# Patient Record
Sex: FEMALE | Race: BLACK OR AFRICAN AMERICAN | NOT HISPANIC OR LATINO | Employment: FULL TIME | URBAN - METROPOLITAN AREA
[De-identification: names, ages, dates, MRNs, and addresses within clinical notes are randomized per-mention and may not be internally consistent; named-entity substitution may affect disease eponyms.]

---

## 2018-01-04 ENCOUNTER — TELEPHONE (OUTPATIENT)
Dept: OBSTETRICS AND GYNECOLOGY | Facility: CLINIC | Age: 48
End: 2018-01-04

## 2018-01-04 DIAGNOSIS — Z12.39 BREAST CANCER SCREENING: Primary | ICD-10-CM

## 2018-01-04 NOTE — TELEPHONE ENCOUNTER
----- Message from Mukul Odonnell sent at 1/4/2018  9:18 AM CST -----  Contact: patient  x_ 1st Request   _ 2nd Request   _ 3rd Request     Who: KODAK PAREDES [[id number]]    Why: patient called is requesting that you put orders put in the system for mammogram.    What Number to Call Back: [phone]    When to Expect a call back: (Before the end of the day)   -- if call after 3:00 call back will be tomorrow.

## 2018-01-30 ENCOUNTER — OFFICE VISIT (OUTPATIENT)
Dept: OBSTETRICS AND GYNECOLOGY | Facility: CLINIC | Age: 48
End: 2018-01-30
Payer: COMMERCIAL

## 2018-01-30 ENCOUNTER — HOSPITAL ENCOUNTER (OUTPATIENT)
Dept: RADIOLOGY | Facility: HOSPITAL | Age: 48
Discharge: HOME | End: 2018-01-30
Attending: NURSE PRACTITIONER
Payer: COMMERCIAL

## 2018-01-30 ENCOUNTER — TELEPHONE (OUTPATIENT)
Dept: OBSTETRICS AND GYNECOLOGY | Facility: CLINIC | Age: 48
End: 2018-01-30

## 2018-01-30 VITALS — BODY MASS INDEX: 35.32 KG/M2 | HEIGHT: 65 IN | WEIGHT: 212 LBS

## 2018-01-30 VITALS
BODY MASS INDEX: 34.47 KG/M2 | HEIGHT: 66 IN | DIASTOLIC BLOOD PRESSURE: 78 MMHG | WEIGHT: 214.5 LBS | SYSTOLIC BLOOD PRESSURE: 144 MMHG

## 2018-01-30 DIAGNOSIS — Z01.419 WELL WOMAN EXAM WITH ROUTINE GYNECOLOGICAL EXAM: Primary | ICD-10-CM

## 2018-01-30 DIAGNOSIS — N92.1 METRORRHAGIA: ICD-10-CM

## 2018-01-30 DIAGNOSIS — N95.1 PERIMENOPAUSE: ICD-10-CM

## 2018-01-30 DIAGNOSIS — Z87.42 HISTORY OF ABNORMAL CERVICAL PAP SMEAR: ICD-10-CM

## 2018-01-30 DIAGNOSIS — N94.6 DYSMENORRHEA: ICD-10-CM

## 2018-01-30 DIAGNOSIS — D25.9 UTERINE LEIOMYOMA, UNSPECIFIED LOCATION: ICD-10-CM

## 2018-01-30 DIAGNOSIS — Z12.39 BREAST CANCER SCREENING: ICD-10-CM

## 2018-01-30 PROCEDURE — 99999 PR PBB SHADOW E&M-EST. PATIENT-LVL III: CPT | Mod: PBBFAC,,, | Performed by: NURSE PRACTITIONER

## 2018-01-30 PROCEDURE — 77067 SCR MAMMO BI INCL CAD: CPT | Mod: TC

## 2018-01-30 PROCEDURE — 77063 BREAST TOMOSYNTHESIS BI: CPT | Mod: 26,,, | Performed by: RADIOLOGY

## 2018-01-30 PROCEDURE — 77067 SCR MAMMO BI INCL CAD: CPT | Mod: 26,,, | Performed by: RADIOLOGY

## 2018-01-30 PROCEDURE — 88175 CYTOPATH C/V AUTO FLUID REDO: CPT

## 2018-01-30 PROCEDURE — 99396 PREV VISIT EST AGE 40-64: CPT | Mod: S$GLB,,, | Performed by: NURSE PRACTITIONER

## 2018-01-30 NOTE — PROGRESS NOTES
HISTORY OF PRESENT ILLNESS:    John Harris is a 47 y.o. female, , Patient's last menstrual period was 2018 (exact date).,  presents for a routine exam and continues with heavy painful periods.   -Doesn't want medical or surgical intervention at this time.   -Using double protection with clots and gushing the first two days, bleeds for a total of 5-6 krishna.   -Periods are more predictable this year and using OTC Ibuprofen successfully for cramping.   -Saw Dr Mac 16 for a surgery consult and decided against surgery, was given a Rx for Lysteda, which she tried, but it made her sleepy, so is not taking it.       DATA REVIEWED:  ENDOMETRIAL BIOPSY: 16:  Benign endometrium  No evidence of hyperplasia or malignancy  PELVIC US 16:  The uterus measures 10.1 x 5.3 x 5.9 cm. Uterine parenchyma demonstrates a single hypoechoic intramural fibroid within the anterior uterine body measuring 3.4 x 2.7 x 3.0 cm. The endometrial echo complex is normal in thickness and measures 12 mm.  Neither ovary was able to be visualized on transabdominal approach.  No significant free fluid seen.  Impression:  3 cm fibroid      Past Medical History:   Diagnosis Date    Abnormal Pap smear     Colposcopy    Obesity        PAST SURGICAL HISTORY:  History reviewed. No pertinent surgical history.    MEDICATIONS AND ALLERGIES:  Ibuprofen prn    Review of patient's allergies indicates:  No Known Allergies    Family History   Problem Relation Age of Onset    Breast cancer Maternal Grandmother     Breast cancer Mother     Breast cancer Maternal Aunt     Colon cancer Neg Hx     Ovarian cancer Neg Hx        Social History     Social History    Marital status:      Spouse name: N/A    Number of children: N/A    Years of education: N/A     Occupational History    Not on file.     Social History Main Topics    Smoking status: Never Smoker    Smokeless tobacco: Not on file    Alcohol use No    Drug  "use: No    Sexual activity: Yes     Partners: Male     Birth control/ protection: Other-see comments      Comment: Vasectomy     Other Topics Concern    Not on file     Social History Narrative    No narrative on file       OB HISTORY: Number of vaginal deliveries: 3     COMPREHENSIVE GYN HISTORY:  PAP History: Reports abnormal Pap: 2003. Treatment: colposcopy. LAST PAP 2-17-14 NORMAL.  Infection History: Reports STDs: HPV. Denies PID.  Benign History: Reports uterine fibroids. Denies ovarian cysts. Denies endometriosis. Denies other conditions.  Cancer History: Denies cervical cancer. Denies uterine cancer or hyperplasia. Denies ovarian cancer. Denies vulvar cancer or pre-cancer. Denies vaginal cancer or pre-cancer. Denies breast cancer. Denies colon cancer.  Sexual Activity History: Denies currently being sexually active  Menstrual History: Flows every 26 - 28 Days. For 5 -6  Days. Heavy Flow. SEE HPI.   Dysmenorrhea History: Reports moderate dysmenorrhea.   Contraception:  has vasectomy.       ROS:  GENERAL: No weight changes. No swelling. No fatigue. No fever.  CARDIOVASCULAR: No chest pain. No shortness of breath. No leg cramps.   NEUROLOGICAL: No headaches. No vision changes.  BREASTS: No pain. No lumps. No discharge.  ABDOMEN: + CRAMPS. No nausea. No vomiting. No diarrhea. No constipation.  REPRODUCTIVE: + HEAVY PERIODS.   VULVA: No pain. No lesions. No itching.  VAGINA: No relaxation. No itching. No odor. No discharge. No lesions.  URINARY: No incontinence. No nocturia. No frequency. No dysuria.    BP (!) 144/78   Ht 5' 6" (1.676 m)   Wt 97.3 kg (214 lb 8.1 oz)   LMP 01/14/2018 (Exact Date)   BMI 34.62 kg/m²     PE:  APPEARANCE: Well nourished, well developed, in no acute distress.  AFFECT: WNL, alert and oriented x 3.  SKIN: No acne or hirsutism.  NECK: Neck symmetric, without masses or thyromegaly.  NODES: No inguinal, cervical, axillary or femoral lymph node enlargement.  CHEST: Good " respiratory effort.   ABDOMEN: Soft. No tenderness or masses. OBESE.  BREASTS: Symmetrical, no skin changes, visible lesions, palpable masses or nipple discharge bilaterally.  PELVIC: External female genitalia without lesions.  Female hair distribution. Adequate perineal body, Normal urethral meatus. Vagina moist and well rugated without lesions or discharge.  No significant cystocele or rectocele present. Cervix pink without lesions, discharge or tenderness. Uterus is 8 week size, regular, mobile and nontender. Adnexa without masses or tenderness. EXAM DIFFICULT DUE TO BODY HABITUS.  EXTREMITIES: No edema    DIAGNOSIS:  1. Well woman exam with routine gynecological exam    2. Perimenopause    3. Uterine leiomyoma, unspecified location    4. Metrorrhagia    5. Dysmenorrhea    6. History of abnormal cervical Pap smear        PLAN:    Orders Placed This Encounter    Liquid-based pap smear, screening   Had mammogram today    COUNSELING:  The patient was counseled today on:  -medical / surgical options for cycle control as per Dr Mac's note 12-8-16, which she declined -advised to call if bleeding/pain worsens;  -A.C.S. Pap and pelvic exam guidelines (pap every 3 years), recomendations for yearly mammogram;  -to follow up with her PCP for other health maintenance.    FOLLOW-UP with me annually.

## 2019-03-20 ENCOUNTER — TELEPHONE (OUTPATIENT)
Dept: OBSTETRICS AND GYNECOLOGY | Facility: CLINIC | Age: 49
End: 2019-03-20

## 2019-03-20 DIAGNOSIS — Z12.31 SCREENING MAMMOGRAM, ENCOUNTER FOR: Primary | ICD-10-CM

## 2019-03-20 NOTE — TELEPHONE ENCOUNTER
----- Message from Porsche Marshall sent at 3/20/2019 11:58 AM CDT -----  Name of Who is Calling:KODAK PAREDES [[id number]]    What is the request in detail: Pt needs an order for a mammogram      Can the clinic reply by [facility]:  No       What Number to Call Back if not in [facility]:[phone]

## 2019-04-09 ENCOUNTER — OFFICE VISIT (OUTPATIENT)
Dept: OBSTETRICS AND GYNECOLOGY | Facility: CLINIC | Age: 49
End: 2019-04-09
Payer: COMMERCIAL

## 2019-04-09 ENCOUNTER — TELEPHONE (OUTPATIENT)
Dept: OBSTETRICS AND GYNECOLOGY | Facility: CLINIC | Age: 49
End: 2019-04-09

## 2019-04-09 ENCOUNTER — HOSPITAL ENCOUNTER (OUTPATIENT)
Dept: RADIOLOGY | Facility: HOSPITAL | Age: 49
Discharge: HOME | End: 2019-04-09
Attending: NURSE PRACTITIONER
Payer: COMMERCIAL

## 2019-04-09 VITALS
BODY MASS INDEX: 31.18 KG/M2 | SYSTOLIC BLOOD PRESSURE: 140 MMHG | WEIGHT: 194 LBS | DIASTOLIC BLOOD PRESSURE: 93 MMHG | HEIGHT: 66 IN

## 2019-04-09 DIAGNOSIS — Z12.31 SCREENING MAMMOGRAM, ENCOUNTER FOR: ICD-10-CM

## 2019-04-09 DIAGNOSIS — N94.6 DYSMENORRHEA: ICD-10-CM

## 2019-04-09 DIAGNOSIS — N92.4 ABNORMAL PERIMENOPAUSAL BLEEDING: ICD-10-CM

## 2019-04-09 DIAGNOSIS — Z87.42 HISTORY OF ABNORMAL CERVICAL PAP SMEAR: ICD-10-CM

## 2019-04-09 DIAGNOSIS — D25.9 UTERINE LEIOMYOMA, UNSPECIFIED LOCATION: ICD-10-CM

## 2019-04-09 DIAGNOSIS — Z01.419 WELL WOMAN EXAM WITH ROUTINE GYNECOLOGICAL EXAM: Primary | ICD-10-CM

## 2019-04-09 PROCEDURE — 77067 MAMMO DIGITAL SCREENING BILAT WITH TOMOSYNTHESIS_CAD: ICD-10-PCS | Mod: 26,,, | Performed by: RADIOLOGY

## 2019-04-09 PROCEDURE — 99396 PR PREVENTIVE VISIT,EST,40-64: ICD-10-PCS | Mod: S$GLB,,, | Performed by: NURSE PRACTITIONER

## 2019-04-09 PROCEDURE — 99999 PR PBB SHADOW E&M-EST. PATIENT-LVL III: CPT | Mod: PBBFAC,,, | Performed by: NURSE PRACTITIONER

## 2019-04-09 PROCEDURE — 77063 MAMMO DIGITAL SCREENING BILAT WITH TOMOSYNTHESIS_CAD: ICD-10-PCS | Mod: 26,,, | Performed by: RADIOLOGY

## 2019-04-09 PROCEDURE — 77067 SCR MAMMO BI INCL CAD: CPT | Mod: TC

## 2019-04-09 PROCEDURE — 99999 PR PBB SHADOW E&M-EST. PATIENT-LVL III: ICD-10-PCS | Mod: PBBFAC,,, | Performed by: NURSE PRACTITIONER

## 2019-04-09 PROCEDURE — 77067 SCR MAMMO BI INCL CAD: CPT | Mod: 26,,, | Performed by: RADIOLOGY

## 2019-04-09 PROCEDURE — 77063 BREAST TOMOSYNTHESIS BI: CPT | Mod: 26,,, | Performed by: RADIOLOGY

## 2019-04-09 PROCEDURE — 99396 PREV VISIT EST AGE 40-64: CPT | Mod: S$GLB,,, | Performed by: NURSE PRACTITIONER

## 2019-04-09 NOTE — PROGRESS NOTES
HISTORY OF PRESENT ILLNESS:    John Harris is a 48 y.o. female, , Patient's last menstrual period was 2019 (approximate).,  presents for a routine exam and continues with abnormal periods.  -Reports skipping periods in October / November, then had a heavy painful flow in December requiring use of double protection, with spotting a few weeks later.   -January, February, March were regular flow.   -Denies vasomotor symptoms.    DATA REVIEWED:  ENDOMETRIAL BIOPSY: 16:  Benign endometrium  No evidence of hyperplasia or malignancy  PELVIC US 16:  The uterus measures 10.1 x 5.3 x 5.9 cm. Uterine parenchyma demonstrates a single hypoechoic intramural fibroid within the anterior uterine body measuring 3.4 x 2.7 x 3.0 cm. The endometrial echo complex is normal in thickness and measures 12 mm.  Neither ovary was able to be visualized on transabdominal approach.  No significant free fluid seen.  Impression:  3 cm fibroid    MY NOTE 18  -Continues with heavy painful periods.   -Doesn't want medical or surgical intervention at this time.   -Using double protection with clots and gushing the first two days, bleeds for a total of 5-6 krishna.   -Periods are more predictable this year and using OTC Ibuprofen successfully for cramping.   -Saw Dr Mac 16 for a surgery consult and decided against surgery, was given a Rx for Lysteda, which she tried, but it made her sleepy, so is not taking it      Past Medical History:   Diagnosis Date    Abnormal Pap smear     Colposcopy    Obesity        PAST SURGICAL HISTORY:  History reviewed. No pertinent surgical history.    MEDICATIONS AND ALLERGIES:  No current outpatient medications on file.    Review of patient's allergies indicates:  No Known Allergies    Family History   Problem Relation Age of Onset    Breast cancer Maternal Grandmother     Breast cancer Mother     Breast cancer Maternal Aunt     Colon cancer Neg Hx     Ovarian cancer Neg  Hx        Social History     Socioeconomic History    Marital status:      Spouse name: Not on file    Number of children: Not on file    Years of education: Not on file    Highest education level: Not on file   Occupational History    Not on file   Social Needs    Financial resource strain: Not on file    Food insecurity:     Worry: Not on file     Inability: Not on file    Transportation needs:     Medical: Not on file     Non-medical: Not on file   Tobacco Use    Smoking status: Never Smoker   Substance and Sexual Activity    Alcohol use: No    Drug use: No    Sexual activity: Yes     Partners: Male     Birth control/protection: Other-see comments     Comment: Vasectomy   Lifestyle    Physical activity:     Days per week: Not on file     Minutes per session: Not on file    Stress: Not on file   Relationships    Social connections:     Talks on phone: Not on file     Gets together: Not on file     Attends [employer] service: Not on file     Active member of club or organization: Not on file     Attends meetings of clubs or organizations: Not on file     Relationship status: Not on file   Other Topics Concern    Not on file   Social History Narrative    Not on file       OB HISTORY: Number of vaginal deliveries: 3     COMPREHENSIVE GYN HISTORY:  PAP History: Reports abnormal Pap: 2003. Treatment: colposcopy. LAST PAP 1-30-18 NORMAL.  Infection History: Reports STDs: HPV. Denies PID.  Benign History: Reports uterine fibroids. Denies ovarian cysts. Denies endometriosis. Denies other conditions.  Cancer History: Denies cervical cancer. Denies uterine cancer or hyperplasia. Denies ovarian cancer. Denies vulvar cancer or pre-cancer. Denies vaginal cancer or pre-cancer. Denies breast cancer. Denies colon cancer.  Sexual Activity History: Denies currently being sexually active  Menstrual History: SEE HPI.   Dysmenorrhea History: Reports moderate dysmenorrhea.   Contraception:  has vasectomy.  "    ROS:  GENERAL: + INTENTIONAL WT LOSS. No swelling. No fatigue. No fever.  CARDIOVASCULAR: No chest pain. No shortness of breath. No leg cramps.   NEUROLOGICAL: No headaches. No vision changes.  BREASTS: No pain. No lumps. No discharge.  ABDOMEN: No pain. No nausea. No vomiting. No diarrhea. No constipation.  REPRODUCTIVE: + AUB.   VULVA: No pain. No lesions. No itching.  VAGINA: No relaxation. No itching. No odor. No discharge. No lesions.  URINARY: No incontinence. No nocturia. No frequency. No dysuria.    BP (!) 140/93   Ht 5' 6" (1.676 m)   Wt 88 kg (194 lb 0.1 oz)   LMP 03/27/2019 (Approximate)   BMI 31.31 kg/m²   1-30-18 Wt 97.3 kg (214 lb 8.1 oz)    PE:  APPEARANCE: Well nourished, well developed, in no acute distress.  AFFECT: WNL, alert and oriented x 3.  SKIN: No acne or hirsutism.  NECK: Neck symmetric, without masses or thyromegaly.  NODES: No inguinal, cervical, axillary or femoral lymph node enlargement.  CHEST: Good respiratory effort.   ABDOMEN: Soft. No tenderness or masses. OBESE.  BREASTS: Symmetrical, no skin changes, visible lesions, palpable masses or nipple discharge bilaterally.  PELVIC: External female genitalia without lesions.  Female hair distribution. Adequate perineal body, Normal urethral meatus. Vagina moist and well rugated without lesions or discharge.  No significant cystocele or rectocele present. Cervix pink without lesions, discharge or tenderness. Uterus is 10 week size, regular, mobile and nontender. Adnexa without masses or tenderness. EXAM DIFFICULT DUE TO BODY HABITUS.  EXTREMITIES: No edema    DIAGNOSIS:  1. Well woman exam with routine gynecological exam    2. Abnormal perimenopausal bleeding    3. Dysmenorrhea    4. Uterine leiomyoma, unspecified location    5. History of abnormal cervical Pap smear        PLAN:    Orders Placed This Encounter    US Pelvis Complete Non OB   Has mammogram scheduled today    COUNSELING:  The patient was counseled today " on:  -perimenopause vs menopause and to report severe vasomotor symptoms and/or skipping periods, heavy, prolonged bleeding, spotting in between periods;  -A.C.S. Pap and pelvic exam guidelines (pap every 3 years), recomendations for yearly mammogram;  -to follow up with her PCP for other health maintenance.    FOLLOW-UP with me pending test results and with Dr Mac for a surgery consult and annually.

## 2019-04-18 ENCOUNTER — HOSPITAL ENCOUNTER (OUTPATIENT)
Dept: RADIOLOGY | Facility: HOSPITAL | Age: 49
Discharge: HOME | End: 2019-04-18
Attending: NURSE PRACTITIONER
Payer: COMMERCIAL

## 2019-04-18 DIAGNOSIS — N92.4 ABNORMAL PERIMENOPAUSAL BLEEDING: ICD-10-CM

## 2019-04-18 DIAGNOSIS — D25.9 UTERINE LEIOMYOMA, UNSPECIFIED LOCATION: ICD-10-CM

## 2019-04-18 PROCEDURE — 76830 US PELVIS COMP WITH TRANSVAG NON-OB (XPD): ICD-10-PCS | Mod: 26,,, | Performed by: RADIOLOGY

## 2019-04-18 PROCEDURE — 76856 US EXAM PELVIC COMPLETE: CPT | Mod: 26,,, | Performed by: RADIOLOGY

## 2019-04-18 PROCEDURE — 76830 TRANSVAGINAL US NON-OB: CPT | Mod: TC

## 2019-04-18 PROCEDURE — 76856 US PELVIS COMP WITH TRANSVAG NON-OB (XPD): ICD-10-PCS | Mod: 26,,, | Performed by: RADIOLOGY

## 2019-04-18 PROCEDURE — 76830 TRANSVAGINAL US NON-OB: CPT | Mod: 26,,, | Performed by: RADIOLOGY

## 2020-12-21 ENCOUNTER — TELEPHONE (OUTPATIENT)
Dept: OBSTETRICS AND GYNECOLOGY | Facility: CLINIC | Age: 50
End: 2020-12-21

## 2020-12-21 DIAGNOSIS — Z12.31 SCREENING MAMMOGRAM, ENCOUNTER FOR: Primary | ICD-10-CM

## 2020-12-21 NOTE — TELEPHONE ENCOUNTER
----- Message from Brinda Haider sent at 12/21/2020 12:21 PM CST -----  Regarding: Orders  Type: mammo         Caller is requesting to schedule their mammo appointment prior to annual appointment.    Order is not listed in EPIC.  Please enter order and contact patient to schedule.         Name of Caller: KODAK PAREDES [[id number]]         Preferred Date and Time of Labs: 1/27 after her appt          Date of EPP Appointment: 1/27          Where would they like the lab performed? [facility]          Would the patient rather a call back or a response via [facility]? Y          Best Call Back Number: [phone]         Additional Information:

## 2021-01-27 ENCOUNTER — HOSPITAL ENCOUNTER (OUTPATIENT)
Dept: RADIOLOGY | Facility: HOSPITAL | Age: 51
Discharge: HOME | End: 2021-01-27
Attending: OBSTETRICS & GYNECOLOGY
Payer: COMMERCIAL

## 2021-01-27 VITALS — WEIGHT: 187.38 LBS | HEIGHT: 65 IN | BODY MASS INDEX: 31.22 KG/M2

## 2021-01-27 DIAGNOSIS — Z12.31 SCREENING MAMMOGRAM, ENCOUNTER FOR: ICD-10-CM

## 2021-01-27 PROCEDURE — 77063 BREAST TOMOSYNTHESIS BI: CPT | Mod: 26,,, | Performed by: RADIOLOGY

## 2021-01-27 PROCEDURE — 77063 MAMMO DIGITAL SCREENING BILAT WITH TOMO: ICD-10-PCS | Mod: 26,,, | Performed by: RADIOLOGY

## 2021-01-27 PROCEDURE — 77067 MAMMO DIGITAL SCREENING BILAT WITH TOMO: ICD-10-PCS | Mod: 26,,, | Performed by: RADIOLOGY

## 2021-01-27 PROCEDURE — 77067 SCR MAMMO BI INCL CAD: CPT | Mod: TC

## 2021-01-27 PROCEDURE — 77067 SCR MAMMO BI INCL CAD: CPT | Mod: 26,,, | Performed by: RADIOLOGY

## 2021-03-08 ENCOUNTER — OFFICE VISIT (OUTPATIENT)
Dept: OBSTETRICS AND GYNECOLOGY | Facility: CLINIC | Age: 51
End: 2021-03-08
Attending: OBSTETRICS & GYNECOLOGY
Payer: COMMERCIAL

## 2021-03-08 VITALS
DIASTOLIC BLOOD PRESSURE: 82 MMHG | BODY MASS INDEX: 32.21 KG/M2 | HEIGHT: 65 IN | SYSTOLIC BLOOD PRESSURE: 128 MMHG | WEIGHT: 193.31 LBS

## 2021-03-08 DIAGNOSIS — Z01.419 ENCOUNTER FOR GYNECOLOGICAL EXAMINATION WITHOUT ABNORMAL FINDING: Primary | ICD-10-CM

## 2021-03-08 DIAGNOSIS — Z12.31 SCREENING MAMMOGRAM, ENCOUNTER FOR: ICD-10-CM

## 2021-03-08 DIAGNOSIS — N92.0 MENORRHAGIA WITH REGULAR CYCLE: ICD-10-CM

## 2021-03-08 DIAGNOSIS — Z12.4 PAP SMEAR FOR CERVICAL CANCER SCREENING: ICD-10-CM

## 2021-03-08 PROCEDURE — 1126F AMNT PAIN NOTED NONE PRSNT: CPT | Mod: S$GLB,,, | Performed by: OBSTETRICS & GYNECOLOGY

## 2021-03-08 PROCEDURE — 88175 CYTOPATH C/V AUTO FLUID REDO: CPT | Performed by: OBSTETRICS & GYNECOLOGY

## 2021-03-08 PROCEDURE — 3008F BODY MASS INDEX DOCD: CPT | Mod: CPTII,S$GLB,, | Performed by: OBSTETRICS & GYNECOLOGY

## 2021-03-08 PROCEDURE — 99396 PREV VISIT EST AGE 40-64: CPT | Mod: S$GLB,,, | Performed by: OBSTETRICS & GYNECOLOGY

## 2021-03-08 PROCEDURE — 1126F PR PAIN SEVERITY QUANTIFIED, NO PAIN PRESENT: ICD-10-PCS | Mod: S$GLB,,, | Performed by: OBSTETRICS & GYNECOLOGY

## 2021-03-08 PROCEDURE — 87624 HPV HI-RISK TYP POOLED RSLT: CPT | Performed by: OBSTETRICS & GYNECOLOGY

## 2021-03-08 PROCEDURE — 99396 PR PREVENTIVE VISIT,EST,40-64: ICD-10-PCS | Mod: S$GLB,,, | Performed by: OBSTETRICS & GYNECOLOGY

## 2021-03-08 PROCEDURE — 3008F PR BODY MASS INDEX (BMI) DOCUMENTED: ICD-10-PCS | Mod: CPTII,S$GLB,, | Performed by: OBSTETRICS & GYNECOLOGY

## 2021-03-15 LAB
CLINICAL INFO: NORMAL
CYTO CVX: NORMAL
CYTOLOGIST CVX/VAG CYTO: NORMAL
CYTOLOGY CMNT CVX/VAG CYTO-IMP: NORMAL
CYTOLOGY PAP THIN PREP EXPLANATION: NORMAL
DATE OF PREVIOUS PAP: NORMAL
DATE PREVIOUS BX: NO
HPV I/H RISK 4 DNA CVX QL NAA+PROBE: NOT DETECTED
LMP START DATE: NORMAL
SPECIMEN SOURCE CVX/VAG CYTO: NORMAL
STAT OF ADQ CVX/VAG CYTO-IMP: NORMAL

## 2021-05-04 ENCOUNTER — PATIENT MESSAGE (OUTPATIENT)
Dept: RESEARCH | Facility: HOSPITAL | Age: 51
End: 2021-05-04

## 2022-04-12 ENCOUNTER — TELEPHONE (OUTPATIENT)
Dept: OBSTETRICS AND GYNECOLOGY | Facility: CLINIC | Age: 52
End: 2022-04-12
Payer: COMMERCIAL

## 2022-04-12 DIAGNOSIS — Z12.31 SCREENING MAMMOGRAM, ENCOUNTER FOR: Primary | ICD-10-CM

## 2022-04-12 NOTE — TELEPHONE ENCOUNTER
----- Message from Constance Orellana sent at 4/12/2022  7:16 AM CDT -----  Regarding: New Mammogram referral please  Good morning,    May I please have a new mammogram referral. My pre-auth team said the one we have is closed and the insurance will not approve it.    Thank you,  Constance

## 2023-03-09 ENCOUNTER — OFFICE VISIT (OUTPATIENT)
Dept: OBSTETRICS AND GYNECOLOGY | Facility: CLINIC | Age: 53
End: 2023-03-09
Payer: COMMERCIAL

## 2023-03-09 VITALS — BODY MASS INDEX: 34.45 KG/M2 | SYSTOLIC BLOOD PRESSURE: 127 MMHG | WEIGHT: 207 LBS | DIASTOLIC BLOOD PRESSURE: 80 MMHG

## 2023-03-09 DIAGNOSIS — Z01.419 WELL WOMAN EXAM WITH ROUTINE GYNECOLOGICAL EXAM: Primary | ICD-10-CM

## 2023-03-09 DIAGNOSIS — Z12.31 ENCOUNTER FOR SCREENING MAMMOGRAM FOR BREAST CANCER: ICD-10-CM

## 2023-03-09 DIAGNOSIS — Z12.4 SCREENING FOR CERVICAL CANCER: ICD-10-CM

## 2023-03-09 PROCEDURE — 3079F DIAST BP 80-89 MM HG: CPT | Mod: CPTII,S$GLB,, | Performed by: OBSTETRICS & GYNECOLOGY

## 2023-03-09 PROCEDURE — 1159F MED LIST DOCD IN RCRD: CPT | Mod: CPTII,S$GLB,, | Performed by: OBSTETRICS & GYNECOLOGY

## 2023-03-09 PROCEDURE — 3074F PR MOST RECENT SYSTOLIC BLOOD PRESSURE < 130 MM HG: ICD-10-PCS | Mod: CPTII,S$GLB,, | Performed by: OBSTETRICS & GYNECOLOGY

## 2023-03-09 PROCEDURE — 1160F PR REVIEW ALL MEDS BY PRESCRIBER/CLIN PHARMACIST DOCUMENTED: ICD-10-PCS | Mod: CPTII,S$GLB,, | Performed by: OBSTETRICS & GYNECOLOGY

## 2023-03-09 PROCEDURE — 1159F PR MEDICATION LIST DOCUMENTED IN MEDICAL RECORD: ICD-10-PCS | Mod: CPTII,S$GLB,, | Performed by: OBSTETRICS & GYNECOLOGY

## 2023-03-09 PROCEDURE — 1160F RVW MEDS BY RX/DR IN RCRD: CPT | Mod: CPTII,S$GLB,, | Performed by: OBSTETRICS & GYNECOLOGY

## 2023-03-09 PROCEDURE — 3079F PR MOST RECENT DIASTOLIC BLOOD PRESSURE 80-89 MM HG: ICD-10-PCS | Mod: CPTII,S$GLB,, | Performed by: OBSTETRICS & GYNECOLOGY

## 2023-03-09 PROCEDURE — 3074F SYST BP LT 130 MM HG: CPT | Mod: CPTII,S$GLB,, | Performed by: OBSTETRICS & GYNECOLOGY

## 2023-03-09 PROCEDURE — 88175 CYTOPATH C/V AUTO FLUID REDO: CPT | Performed by: OBSTETRICS & GYNECOLOGY

## 2023-03-09 PROCEDURE — 3008F PR BODY MASS INDEX (BMI) DOCUMENTED: ICD-10-PCS | Mod: CPTII,S$GLB,, | Performed by: OBSTETRICS & GYNECOLOGY

## 2023-03-09 PROCEDURE — 99396 PR PREVENTIVE VISIT,EST,40-64: ICD-10-PCS | Mod: S$GLB,,, | Performed by: OBSTETRICS & GYNECOLOGY

## 2023-03-09 PROCEDURE — 3008F BODY MASS INDEX DOCD: CPT | Mod: CPTII,S$GLB,, | Performed by: OBSTETRICS & GYNECOLOGY

## 2023-03-09 PROCEDURE — 99999 PR PBB SHADOW E&M-EST. PATIENT-LVL III: CPT | Mod: PBBFAC,,, | Performed by: OBSTETRICS & GYNECOLOGY

## 2023-03-09 PROCEDURE — 99396 PREV VISIT EST AGE 40-64: CPT | Mod: S$GLB,,, | Performed by: OBSTETRICS & GYNECOLOGY

## 2023-03-09 PROCEDURE — 99999 PR PBB SHADOW E&M-EST. PATIENT-LVL III: ICD-10-PCS | Mod: PBBFAC,,, | Performed by: OBSTETRICS & GYNECOLOGY

## 2023-03-09 RX ORDER — AMLODIPINE BESYLATE 5 MG/1
10 TABLET ORAL DAILY
COMMUNITY

## 2023-03-09 NOTE — PROGRESS NOTES
GYNECOLOGY OFFICE NOTE    Reason for visit: annual    HPI: Pt is a 52 y.o.  female  who presents for annual. Menarche: 14. Cycle: Interval- variable for the last few years. Last cycle 2022 and Dec 2022. Light spotting only. Reports night sweats that are tolerable and improving. Discussed options for management- states she normally gets bad side effects with medication and declines at this time. We discussed OTC herbal remedies as well. She is sexually active.  She does not desire STI screening. She denies vaginal discharge.  Last pap: 2021, reports hx of abnormal. Last MMG 2022- negative.     Pelvic US:2019  Uterus:Size: 9.9 x 6.2 x 7.1 cm, enlarged.  Masses: 2 intramural fibroids, 1 anteriorly in the uterine body measuring 4 x 3.5 x 3.7 cm and 1 posteriorly in the uterine body measuring 1.3 x 8 x 1.3 cm.  There is diffuse heterogeneity of the myometrium, which can be seen with adenomyosis.  There may be some small myometrial cysts.  Endometrium: Normal in this pre menopausal patient, measuring 3 mm.  Right ovary:Size: 3.8 x 1.9 x 2.7 cm Appearance: Unremarkable Vascular flow: Normal.  Left ovary:Not visualized.  No adnexal masses.  Free Fluid:None.  IMPRESSION:   Two intramural fibroids. Heterogeneity of the myometrium, which can be seen with adenomyosis. Normal right ovary.  Nonvisualization of the left ovary.    Past Medical History:   Diagnosis Date    Abnormal Pap smear     Colposcopy    Obesity        History reviewed. No pertinent surgical history.    Family History   Problem Relation Age of Onset    Breast cancer Maternal Grandmother 77    Breast cancer Maternal Aunt 65    Hypertension Mother     Ovarian cancer Neg Hx     Colon cancer Neg Hx     Diabetes Neg Hx     Stroke Neg Hx        Social History     Tobacco Use    Smoking status: Never    Smokeless tobacco: Never   Substance Use Topics    Alcohol use: No    Drug use: No       OB History    Para Term  AB Living   3  3 0 3   3   SAB IAB Ectopic Multiple Live Births           3      # Outcome Date GA Lbr [place]/2nd Weight Sex Delivery Anes PTL Lv   3      F Vag-Spont   JUAN   2      M    JUAN   1      F Vag-Spont   JUAN       Current Outpatient Medications   Medication Sig    amLODIPine (NORVASC) 5 MG tablet Take 10 mg by mouth once daily.     No current facility-administered medications for this visit.       Allergies: Patient has no known allergies.     /80   Wt 93.9 kg (207 lb)   LMP 2022 (Approximate)   BMI 34.45 kg/m²     ROS:  GENERAL: Denies fever or chills.   SKIN: Denies rash or lesions.   HEAD: Denies head injury or headache.   CHEST: Denies chest pain or shortness of breath.   CARDIOVASCULAR: Denies palpitations or chest pain.   ABDOMEN: No constipation, diarrhea, nausea, vomiting or rectal bleeding.   URINARY: No dysuria, hematuria, or burning on urination.  REPRODUCTIVE: See HPI.   BREASTS: see HPI  NEUROLOGIC: Denies syncope or weakness.     Physical Exam:  GENERAL: alert, appears stated age and cooperative  NEUROLOGIC: orientated to person, place and time, normal mood and affect   CHEST: Normal respiratory effort  NECK: normal appearance  SKIN: no acne, hirsutism  BREAST EXAM: breasts appear normal, no suspicious masses, no skin or nipple changes or axillary nodes  ABDOMEN: abdomen is soft without significant tenderness, masses  EXTERNAL GENITALIA:  normal general appearance  URETHRA: normal urethra, normal urethral meatus  VAGINA:  normal mucosa, no  lesions  CERVIX:  Normal  UTERUS:  mobile, non tender  ADNEXA: nontender    Diagnosis:  1. Well woman exam with routine gynecological exam    2. Screening for cervical cancer    3. Encounter for screening mammogram for breast cancer        Plan:   1. Annual  2. Pap  3. MMG ordered      Orders Placed This Encounter    Mammo Digital Screening Bilat w/ Vitaly    Liquid-Based Pap Smear, Screening           Rebecca Blanco,  MD  OB/GYN

## 2023-03-10 ENCOUNTER — TELEPHONE (OUTPATIENT)
Dept: ADMINISTRATIVE | Facility: OTHER | Age: 53
End: 2023-03-10
Payer: COMMERCIAL

## 2023-03-14 ENCOUNTER — OUTSIDE PLACE OF SERVICE (OUTPATIENT)
Dept: CARDIOLOGY | Facility: CLINIC | Age: 53
End: 2023-03-14
Payer: COMMERCIAL

## 2023-03-14 PROCEDURE — 93010 PR ELECTROCARDIOGRAM REPORT: ICD-10-PCS | Mod: ,,, | Performed by: INTERNAL MEDICINE

## 2023-03-14 PROCEDURE — 93010 ELECTROCARDIOGRAM REPORT: CPT | Mod: ,,, | Performed by: INTERNAL MEDICINE

## 2023-03-15 LAB
FINAL PATHOLOGIC DIAGNOSIS: NORMAL
Lab: NORMAL

## 2023-03-23 DIAGNOSIS — Z12.31 ENCOUNTER FOR SCREENING MAMMOGRAM FOR BREAST CANCER: Primary | ICD-10-CM

## 2023-03-24 ENCOUNTER — TELEPHONE (OUTPATIENT)
Dept: ADMINISTRATIVE | Facility: OTHER | Age: 53
End: 2023-03-24
Payer: COMMERCIAL

## 2024-09-18 ENCOUNTER — OFFICE VISIT (OUTPATIENT)
Dept: OBSTETRICS AND GYNECOLOGY | Facility: CLINIC | Age: 54
End: 2024-09-18
Payer: COMMERCIAL

## 2024-09-18 VITALS
WEIGHT: 194.88 LBS | DIASTOLIC BLOOD PRESSURE: 70 MMHG | BODY MASS INDEX: 32.47 KG/M2 | SYSTOLIC BLOOD PRESSURE: 118 MMHG | HEIGHT: 65 IN

## 2024-09-18 DIAGNOSIS — D21.9 FIBROIDS: ICD-10-CM

## 2024-09-18 DIAGNOSIS — Z78.0 POSTMENOPAUSAL: ICD-10-CM

## 2024-09-18 DIAGNOSIS — Z01.419 ENCOUNTER FOR GYNECOLOGICAL EXAMINATION WITHOUT ABNORMAL FINDING: Primary | ICD-10-CM

## 2024-09-18 PROBLEM — N20.0 LEFT NEPHROLITHIASIS: Status: ACTIVE | Noted: 2023-01-25

## 2024-09-18 PROBLEM — I10 ESSENTIAL (PRIMARY) HYPERTENSION: Status: ACTIVE | Noted: 2024-09-18

## 2024-09-18 PROCEDURE — 99999 PR PBB SHADOW E&M-EST. PATIENT-LVL III: CPT | Mod: PBBFAC,,, | Performed by: NURSE PRACTITIONER

## 2024-09-18 PROCEDURE — 99396 PREV VISIT EST AGE 40-64: CPT | Mod: S$GLB,,, | Performed by: NURSE PRACTITIONER

## 2024-09-18 PROCEDURE — 1160F RVW MEDS BY RX/DR IN RCRD: CPT | Mod: CPTII,S$GLB,, | Performed by: NURSE PRACTITIONER

## 2024-09-18 PROCEDURE — 3078F DIAST BP <80 MM HG: CPT | Mod: CPTII,S$GLB,, | Performed by: NURSE PRACTITIONER

## 2024-09-18 PROCEDURE — 3008F BODY MASS INDEX DOCD: CPT | Mod: CPTII,S$GLB,, | Performed by: NURSE PRACTITIONER

## 2024-09-18 PROCEDURE — 3074F SYST BP LT 130 MM HG: CPT | Mod: CPTII,S$GLB,, | Performed by: NURSE PRACTITIONER

## 2024-09-18 PROCEDURE — 1159F MED LIST DOCD IN RCRD: CPT | Mod: CPTII,S$GLB,, | Performed by: NURSE PRACTITIONER

## 2024-09-18 RX ORDER — AMLODIPINE BESYLATE 10 MG/1
10 TABLET ORAL
COMMUNITY
Start: 2024-07-28

## 2024-09-18 RX ORDER — BETAMETHASONE VALERATE 1.2 MG/G
OINTMENT TOPICAL
COMMUNITY
Start: 2024-08-01

## 2024-09-18 NOTE — PROGRESS NOTES
CC: Well woman exam    John Harris is a 54 y.o. female  presents for well woman exam.  LMP: Patient's last menstrual period was 2023..    Last pap 3/9/23 - normal   Mmg  normal   Lives in [place] - was going to [place] then [facility] - happy with coming to [facility] Maintenance Topics with due status: Not Due       Topic Last Completion Date    Cervical Cancer Screening 2023    Mammogram 2024    TETANUS VACCINE 2024     Past Medical History:   Diagnosis Date    Abnormal Pap smear     Colposcopy    Obesity      History reviewed. No pertinent surgical history.  Social History     Socioeconomic History    Marital status:    Tobacco Use    Smoking status: Never    Smokeless tobacco: Never   Substance and Sexual Activity    Alcohol use: No    Drug use: No    Sexual activity: Yes     Partners: Male     Birth control/protection: Partner-Vasectomy     Comment: , since      Social Determinants of Health     Financial Resource Strain: Low Risk  (2024)    Overall Financial Resource Strain (CARDIA)     Difficulty of Paying Living Expenses: Not hard at all   Food Insecurity: No Food Insecurity (2024)    Hunger Vital Sign     Worried About Running Out of Food in the Last Year: Never true     Ran Out of Food in the Last Year: Never true   Physical Activity: Insufficiently Active (2024)    Exercise Vital Sign     Days of Exercise per Week: 3 days     Minutes of Exercise per Session: 40 min   Stress: No Stress Concern Present (2024)    [employer] [employer] - Occupational Stress Questionnaire     Feeling of Stress : Only a little   Housing Stability: Unknown (2024)    Housing Stability Vital Sign     Unable to Pay for Housing in the Last Year: No     Family History   Problem Relation Name Age of Onset    Breast cancer Maternal Grandmother  77    Breast cancer Maternal Aunt  65    Hypertension Mother       "Ovarian cancer Neg Hx      Colon cancer Neg Hx      Diabetes Neg Hx      Stroke Neg Hx       OB History          3    Para   3    Term   0       3    AB        Living   3         SAB        IAB        Ectopic        Multiple        Live Births   3                 /70   Ht 5' 5" (1.651 m)   Wt 88.4 kg (194 lb 14.2 oz)   LMP 2023   BMI 32.43 kg/m²       ROS:  Per hpi    PHYSICAL EXAM:  APPEARANCE: Well nourished, well developed, in no acute distress.  AFFECT: WNL, alert and oriented x 3  SKIN: No acne or hirsutism  NECK: Neck symmetric without masses or thyromegaly  NODES: No inguinal, cervical, axillary, or femoral lymph node enlargement  CHEST: Good respiratory effect  ABDOMEN: Soft.  No tenderness or masses.  No hepatosplenomegaly.  No hernias.  BREASTS: Symmetrical, no skin changes or visible lesions.  No palpable masses, nipple discharge bilaterally.  PELVIC: Normal external genitalia without lesions.  Normal hair distribution.  Adequate perineal body, normal urethral meatus.  Vagina moist and well rugated without lesions or discharge.  Cervix pink, without lesions, discharge or tenderness.  No significant cystocele or rectocele.  Bimanual exam shows uterus to be normal 8-10, regular, mobile and nontender.  Adnexa without masses or tenderness.    EXTREMITIES: No edema.  Physical Exam    1. Encounter for gynecological examination without abnormal finding        2. Fibroids        3. Postmenopausal         AND PLAN:    John was seen today for well woman.    Diagnoses and all orders for this visit:    Encounter for gynecological examination without abnormal finding    Fibroids    Postmenopausal         Patient was counseled today on A.C.S. Pap guidelines and recommendations for yearly pelvic exams, mammograms and monthly self breast exams; to see her PCP for other health maintenance.                     "

## 2025-06-16 ENCOUNTER — HOSPITAL ENCOUNTER (OUTPATIENT)
Dept: RADIOLOGY | Facility: HOSPITAL | Age: 55
Discharge: HOME | End: 2025-06-16
Attending: OBSTETRICS & GYNECOLOGY
Payer: COMMERCIAL

## 2025-06-16 VITALS — BODY MASS INDEX: 32.47 KG/M2 | HEIGHT: 65 IN | WEIGHT: 194.88 LBS

## 2025-06-16 DIAGNOSIS — Z12.31 ENCOUNTER FOR SCREENING MAMMOGRAM FOR BREAST CANCER: ICD-10-CM

## 2025-06-16 PROCEDURE — 77063 BREAST TOMOSYNTHESIS BI: CPT | Mod: 26,,, | Performed by: RADIOLOGY

## 2025-06-16 PROCEDURE — 77067 SCR MAMMO BI INCL CAD: CPT | Mod: 26,,, | Performed by: RADIOLOGY

## 2025-06-16 PROCEDURE — 77067 SCR MAMMO BI INCL CAD: CPT | Mod: TC,PN
